# Patient Record
Sex: MALE | Race: WHITE | Employment: OTHER | URBAN - METROPOLITAN AREA
[De-identification: names, ages, dates, MRNs, and addresses within clinical notes are randomized per-mention and may not be internally consistent; named-entity substitution may affect disease eponyms.]

---

## 2021-07-23 RX ORDER — LIDOCAINE HYDROCHLORIDE 20 MG/ML
15 SOLUTION OROPHARYNGEAL AS NEEDED
Status: CANCELLED | OUTPATIENT
Start: 2021-07-23

## 2022-05-02 ENCOUNTER — HOSPITAL ENCOUNTER (OUTPATIENT)
Dept: SLEEP MEDICINE | Age: 77
Discharge: HOME | End: 2022-05-02
Payer: COMMERCIAL

## 2022-05-02 PROCEDURE — 95810 POLYSOM 6/> YRS 4/> PARAM: CPT

## 2022-05-10 ENCOUNTER — HOSPITAL ENCOUNTER (OUTPATIENT)
Age: 77
Setting detail: OUTPATIENT SURGERY
Discharge: HOME | End: 2022-05-10
Attending: INTERNAL MEDICINE | Admitting: INTERNAL MEDICINE
Payer: MEDICARE

## 2022-05-10 PROCEDURE — 91010 ESOPHAGUS MOTILITY STUDY: CPT | Performed by: INTERNAL MEDICINE

## 2022-05-10 PROCEDURE — 74011000250 HC RX REV CODE- 250: Performed by: INTERNAL MEDICINE

## 2022-05-10 PROCEDURE — 2709999900 HC NON-CHARGEABLE SUPPLY: Performed by: INTERNAL MEDICINE

## 2022-05-10 RX ORDER — OXYCODONE AND ACETAMINOPHEN 10; 325 MG/1; MG/1
TABLET ORAL
COMMUNITY

## 2022-05-10 RX ORDER — LOSARTAN POTASSIUM 100 MG/1
100 TABLET ORAL DAILY
COMMUNITY

## 2022-05-10 RX ORDER — DULOXETIN HYDROCHLORIDE 60 MG/1
60 CAPSULE, DELAYED RELEASE ORAL DAILY
COMMUNITY

## 2022-05-10 RX ORDER — LIDOCAINE HYDROCHLORIDE 20 MG/ML
15 SOLUTION OROPHARYNGEAL AS NEEDED
Status: DISCONTINUED | OUTPATIENT
Start: 2022-05-10 | End: 2022-05-10 | Stop reason: HOSPADM

## 2022-05-10 RX ORDER — POTASSIUM CITRATE 15 MEQ/1
15 TABLET, EXTENDED RELEASE ORAL 2 TIMES DAILY WITH MEALS
COMMUNITY

## 2022-05-10 RX ADMIN — Medication 15 ML: at 09:47

## 2022-05-24 NOTE — PROCEDURES
[facility]  PROCEDURE NOTE    Name:  Shila Morales  MR#:  [id number]  :  1945  ACCOUNT #:  [de-identified]  DATE OF SERVICE:  05/10/2022    PREOPERATIVE DIAGNOSES:  Gastroesophageal reflux disease and dysphagia. POSTOPERATIVE DIAGNOSIS:  Achalasia type 1 - see details below. PROCEDURE PERFORMED:  High-resolution impedance manometry. PROCEDURE:  After obtaining informed consent, the patient underwent nasal intubation. The lower esophageal sphincter was located between 47.9 and 52.2 cm from the nares. There was no hiatal hernia. The resting LES pressure mid respiration was grossly elevated at 62 mmHg. Relaxation was incomplete with IRP averaging 28 mmHg. Normal is less than 20. Ten wet swallows were recorded. All 10 failed. The DCI was virtually zero on all of these. There was no panesophageal pressurization or premature contractions. This is consistent with type 1 achalasia. The impedance metrics revealed 0% of liquid swallows and 20% of viscous swallows with complete transit. The UES pressure was 62 mmHg. DISPOSITION:  Further followup will be per Dr. [facility].       Oni Encarnacion MD      [id number]  D:  2022 8:38  T:  2022 15:10  JOB #:  [id number]  CC:  Imer Forman MD

## 2022-06-13 ENCOUNTER — HOSPITAL ENCOUNTER (OUTPATIENT)
Dept: SLEEP CENTER | Age: 77
Discharge: HOME | End: 2022-06-16
Payer: MEDICARE

## 2022-06-13 PROCEDURE — 95811 POLYSOM 6/>YRS CPAP 4/> PARM: CPT

## 2022-06-15 ENCOUNTER — TELEPHONE (OUTPATIENT)
Dept: SLEEP MEDICINE | Age: 77
End: 2022-06-15

## 2022-06-15 NOTE — TELEPHONE ENCOUNTER
Needs New Pt PSG: AHI 45.3 SaO2 80%    -pt returned called at 9:09am however states that he is unable to discuss results that this time as he had just arrived at the grocery store.  Pt states that once he returns home, he will return the call.      -spoke with pt and is scheduled for 06/22/22

## 2022-07-05 NOTE — PROGRESS NOTES
[address] [place]. [address] [facility], [address] [facility]  [phone]    Patient Name:  Ayse Collier  YOB: 1945      Office Visit 7/8/2022    CHIEF COMPLAINT:    Chief Complaint   Patient presents with    Sleep Apnea       HISTORY OF PRESENT ILLNESS:      This is a 22-year-old gentleman seen in outpatient consultation at the request of Dr. Jeanie Rojas for evaluation management of sleep apnea. The patient reports that he has over the past year developed severe issues with hypersomnia. He cannot explain it but he is sleeping 16 to 18 hours/day. He is on 4 times a day Percocet for chronic pain from his spine but he never had issues with the hypersomnia until the past year. Because of his issues with sleepiness, a polysomnography was ordered which revealed a very poor quality study initially with a sleep efficiency of only 27%. He did go into REM briefly. His AHI was markedly elevated at 45.3 and he desaturated as low as 80%. About half the study was spent with him having saturations less than 89%. He also demonstrated a very high number of leg movements with a PLM index being 86 though these were not associated with significant arousals. The overall arousal index however was elevated at 46.7. A subsequent CPAP titration was performed which revealed improved sleep quality with a sleep efficiency of 52%. The study however was limited by very poor REM sleep with only 1.6% of total sleep time being spent in REM sleep. The patient was noted to have a PLM index elevated 44.2 again not associated with significant arousals. The arousal index overall however was decreased down to 2.4/h with CPAP therapy. The patient does report a history of restless legs also developing of late. He is on Cymbalta which could potentially be promoting the leg movements. We will go ahead and check a ferritin level to determine if he is iron deficient.   If so supplemental iron may be helpful. He is on antidepressants with Zoloft which can also promote the leg movements. 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  He is now on antiretroviral therapy. His last CD4 count was over 930 back in December. The patient also reports a history of prostate cancer status post prostatectomy. He did have an artificial sphincter placed and does not have any issues with incontinence. He does however have to go to the bathroom about 4 times per night which significantly impairs his sleep quality. By his description he is spending much of the day sleeping as well. I also discussed with the patient that we need to rule out other etiologies for his rapidly progressive hypersomnia. He has not had his thyroid function checked in several years. He also takes narcotics 4 times daily and we need to rule out renal dysfunction since metabolites can build up in the setting of renal dysfunction and cause significant hypersomnia. We will check a TSH and BMP today as well.       Sleep Medicine 7/8/2022   Sitting and reading 0   Watching TV 1   Sitting, inactive in a public place (e.g. a theatre or a meeting) 0   As a passenger in a car for an hour without a break 1   Lying down to rest in the afternoon when circumstances permit 3   Sitting and talking to someone 0   Sitting quietly after a lunch without alcohol 1   In a car, while stopped for a few minutes in traffic 0   Total score 6       SLEEP STUDY PSG-5/2/22      CPAP TITRATION STUDY-6/13/22        Past Medical History:   Diagnosis Date    Chronic pain     HIV (human immunodeficiency virus infection) ([facility] [address]     Hypertension     Sleep apnea          Patient Active Problem List   Diagnosis    DOUGLAS (obstructive sleep apnea)    HTN (hypertension)    Chronic pain    Depression    Hypersomnia    RLS (restless legs syndrome)          Past Surgical History:   Procedure Laterality Date    SPINE SURGERY      For staph infection           Social History     Socioeconomic History    Marital status: Single     Spouse name: Not on file    Number of children: Not on file    Years of education: Not on file    Highest education level: Not on file   Occupational History    Not on file   Tobacco Use    Smoking status: Never Smoker    Smokeless tobacco: Never Used   Substance and Sexual Activity    Alcohol use: Not on file    Drug use: Not on file    Sexual activity: Not on file   Other Topics Concern    Not on file   Social History Narrative    Not on file     Social Determinants of Health     Financial Resource Strain:     Difficulty of Paying Living Expenses: Not on file   Food Insecurity:     Worried About Running Out of Food in the Last Year: Not on file    Charles of Food in the Last Year: Not on file   Transportation Needs:     Lack of Transportation (Medical): Not on file    Lack of Transportation (Non-Medical): Not on file   Physical Activity:     Days of Exercise per Week: Not on file    Minutes of Exercise per Session: Not on file   Stress:     Feeling of Stress : Not on file   Social Connections:     Frequency of Communication with Friends and Family: Not on file    Frequency of Social Gatherings with Friends and Family: Not on file    Attends Taoist Services: Not on file    Active Member of [address] [place] [address] [employer] or Organizations: Not on file    Attends Club or Organization Meetings: Not on file    Marital Status: Not on file   Intimate Partner Violence:     Fear of Current or Ex-Partner: Not on file    Emotionally Abused: Not on file    Physically Abused: Not on file    Sexually Abused: Not on file   Housing Stability:     Unable to Pay for Housing in the Last Year: Not on file    Number of [place] in the Last Year: Not on file    Unstable Housing in the Last Year: Not on file         No family history on file.       Allergies   Allergen Reactions    Latex Other (See Comments)     BLISTERS SKIN    Contrast [Iodides] Anaphylaxis     IVP DYE    Codeine Hives         Current Outpatient Medications   Medication Sig    atorvastatin (LIPITOR) 20 MG tablet TAKE 1 TABLET BY MOUTH EVERYDAY AT BEDTIME    DULoxetine (CYMBALTA) 20 MG extended release capsule Take 60 mg by mouth daily    elvitegravir-cobicistat-emtricitabine-tenofovir alafenamide (GENVOYA) [phone] MG TABLET Take 1 tablet by mouth    losartan (COZAAR) 100 MG tablet Take 100 mg by mouth daily    metoprolol tartrate (LOPRESSOR) 25 MG tablet Take 25 mg by mouth    Potassium Citrate ER 15 MEQ (1620 MG) TBCR Take 15 mEq by mouth 2 times daily (with meals)    sertraline (ZOLOFT) 100 MG tablet TAKE 1 TABLET BY MOUTH EVERY DAY    oxyCODONE-acetaminophen (PERCOCET)  MG per tablet Take 1 tablet by mouth every 6 hours as needed.  oxyCODONE ER (XTAMPZA ER) 13.5 MG C12A TAKE 1 CAPSULE BY MOUTH NIGHTLY WITH FOOD     No current facility-administered medications for this visit. REVIEW OF SYSTEMS:   CONSTITUTIONAL:   There is no history of fever, chills, night sweats, weight loss, weight gain; he has had persistent fatigue and severe lethargy/hypersomnolence. EYES:   Denies problems with eye pain, erythema, blurred vision, or visual field loss. ENTM:   Denies history of tinnitus, epistaxis, sore throat, hoarseness, or dysphonia. LYMPH:   Denies swollen glands. CARDIAC:   No chest pain, pressure, discomfort, palpitations, orthopnea, murmurs, or edema. GI:   No dysphagia, heartburn reflux, nausea/vomiting, diarrhea, abdominal pain, or bleeding. :   Denies history of dysuria, hematuria, polyuria, or decreased urine output. MS:   No history of myalgias, arthralgias, bone pain, or muscle cramps. SKIN:   No history of rashes, jaundice, cyanosis, nodules, or ulcers. ENDO:   Negative for heat or cold intolerance. No history of DM. PSYCH:   Negative for anxiety, depression, insomnia, hallucinations.     NEURO:   There is no history of AMS, persistent headache, decreased level of consciousness, seizures, or motor or sensory deficits. PHYSICAL EXAM:    Vitals:    07/08/22 1501   BP: 112/82   Pulse: 94   Resp: 15   Temp: 97.3 °F (36.3 °C)   SpO2: 96%        GENERAL APPEARANCE:   The patient is normal weight and in no respiratory distress. HEENT:   PERRL. Conjunctivae unremarkable. Nasal mucosa is without epistaxis, exudate, or polyps. Gums and dentition are unremarkable. There is moderate oropharyngeal narrowing. NECK/LYMPHATIC:   Symmetrical with no elevation of jugular venous pulsation. Trachea midline. No thyroid enlargement. No cervical adenopathy. LUNGS:   Normal respiratory effort with symmetrical lung expansion. Breath sounds are fairly clear bilaterally. HEART:   There is a regular rate and rhythm. No murmur, rub, or gallop. There is no edema in the lower extremities. ABDOMEN:   Soft and non-tender. Bowel sounds are normal.     SKIN:   There are no rashes, cyanosis, jaundice, or ecchymosis present. EXTREMITIES:   The extremities are unremarkable without clubbing, cyanosis, joint inflammation, degenerative, or ischemic change. MUSCULOSKELETAL:   There is no abnormal tone, muscle atrophy, or abnormal movement present. There is a deep old scar in the lower cervical/upper thoracic spine. NEURO:   The patient is alert and oriented to person, place, and time. Memory appears intact and mood is normal.  No gross sensorimotor deficits are present. ASSESSMENT:  (Medical Decision Making)         ICD-10-CM    1. DOUGLAS (obstructive sleep apnea)  G47.33 The patient has severe obstructive sleep apnea associated with moderately severe arterial hypoxemia. Application of CPAP at 12 cmH2O was adequate to control disordered breathing. CPAP titration was limited by very poor REM sleep duration and so I will treat him with APAP at 12-15 cmH2O just in case he needs more pressure.   He did like the full facemask use during the study and this will be maintained. 2. Hypertension, unspecified type  I10 Patient's blood pressure is adequately controlled. 3. Chronic pain syndrome  G89.4 The patient is on Percocet 4 times daily. Metabolites can build up and cause significant hypersomnia especially in the setting of renal insufficiency. A BMP will be checked. 4. Depression, unspecified depression type  F32. A The patient is being treated with Zoloft 100 mg daily. This medication can promote leg movements and restless legs. 5. Hypersomnia  G47.10 This is possibly related to his sleep apnea but could also be related to restless legs and/or medications particularly buildup of metabolites of Percocet. 6. RLS (restless legs syndrome)  G25.81 We will check a ferritin level today to make sure he is not iron deficient. He may need additional therapy if leg movements persist despite correction of his sleep disordered breathing. PLAN:    Begin APAP at 12-15 cmH2O with an EPR of 3. A large ResMed AirFit F 20 mask will be ordered for him. Check BMP, TSH, and ferritin level today. I will call him back with the results when available. Begin supplemental iron if ferritin level is low. Synthroid will be needed if his TSH level is low. Follow-up will be in about 4 months.            Orders Placed This Encounter   Procedures    TSH     Standing Status:   Future     Number of Occurrences:   1     Standing Expiration Date:   2023    Basic Metabolic Panel     Standing Status:   Future     Number of Occurrences:   1     Standing Expiration Date:   2023    Ferritin     Standing Status:   Future     Number of Occurrences:   1     Standing Expiration Date:   2023   [facility] DME - DURABLE MEDICAL EQUIPMENT     [facility]  Phone: [phone] [address]  Dept: [phone]      Patient Name: Reinaldo Boyle  : 1945  Gender: male  Address: [address] [place] [address]  Patient phone number: [phone] (home)       Primary Insurance: Payor: Anastacia Payne / Plan: Torin Cervantes / Product Type: *No Product type* /   Subscriber ID: [id number] - (Medicare Managed)      AMB Supply Order  Order Details     DME Location: [address] [place] [address] Road   Order Date: 7/8/2022   Diagnoses of DOUGLAS (obstructive sleep apnea), Hypertension, unspecified type, Chronic pain syndrome, Depression, unspecified depression type, Hypersomnia, and RLS (restless legs syndrome) were pertinent to this visit.           (  X   )New Set-Up    Machine   (     ) CPAP Unit  (  x   ) Auto CPAP Unit  (     ) BiLevel Unit  (     ) Auto BiLevel Unit  (     ) ASV   (     ) Bilevel ST    (     ) Oxygen Concentrator         Length of need: 12 months    Pressure: 12-15 cmH2O cmH20  EPR: 3     Starting Ramp Pressure:  6 cm H20  Ramp Time: min 20    Patient had a diagnostic Apnea Hypopnea Index (AHI) of : 45.3    *SUPPLIES* Replace all as needed, or per coverage guidelines     Masks Type:    (  x   ) -Full Face Mask (1 per 3 mon) <<< Large ResMed F 20 >>>  ( x    ) -Full Mask (1 per month) Interface/Cushion      (     ) -Nasal Mask (1 per 3 mon)  (     ) - Nasal Mask (1 per month) Interface/Cushion  (     ) -Pillow (2 per mon)  (     ) -Zovaouqvu (1 per 6 mon)      _________________________________________________________________          Other Supplies:    (  X   )-Jkwhmbiy (1 per 6 mon)  ( X    )-Yacaie Tubing (1 per 3 mon)  (  X   )- Disposable Filter (2 per mon)  (   X  )-Ovwnbt Humidifier (1 per year)     (  x   )-Xribsjxen (sometimes used with Full Face Mask) (1 per 6 mos)  (     )-Tubing-without heat (1 per 3 mos)  ( X   )-Non-Disposable Filter (1 per 6 mos)  (   x  )-Water Chamber (1 per 6 mos)  (     )-Humidifier non-heated (1 per 5 yrs)      Signed Date: 7/8/2022  Electronically Signed By: MD Nirmala Adams MD  Electronically signed    Over 50% of today's office visit was spent in face to face time reviewing test results, prognosis, importance of compliance, education about disease process, benefits of medications, instructions for management of acute flare-ups, and follow up plans. Total face to face time spent with the patient and charting was 60 minutes. Dictated using voice recognition software.   Proof read but unrecognized errors may exist.

## 2022-07-08 ENCOUNTER — OFFICE VISIT (OUTPATIENT)
Dept: SLEEP MEDICINE | Age: 77
End: 2022-07-08
Payer: MEDICARE

## 2022-07-08 ENCOUNTER — HOSPITAL ENCOUNTER (OUTPATIENT)
Dept: LAB | Age: 77
Discharge: HOME | End: 2022-07-11

## 2022-07-08 VITALS
HEART RATE: 94 BPM | BODY MASS INDEX: 28.23 KG/M2 | DIASTOLIC BLOOD PRESSURE: 82 MMHG | SYSTOLIC BLOOD PRESSURE: 112 MMHG | RESPIRATION RATE: 15 BRPM | OXYGEN SATURATION: 96 % | TEMPERATURE: 97.3 F | HEIGHT: 72 IN | WEIGHT: 208.4 LBS

## 2022-07-08 DIAGNOSIS — G89.4 CHRONIC PAIN SYNDROME: ICD-10-CM

## 2022-07-08 DIAGNOSIS — G47.33 OSA (OBSTRUCTIVE SLEEP APNEA): ICD-10-CM

## 2022-07-08 DIAGNOSIS — F32.A DEPRESSION, UNSPECIFIED DEPRESSION TYPE: ICD-10-CM

## 2022-07-08 DIAGNOSIS — G47.10 HYPERSOMNIA: ICD-10-CM

## 2022-07-08 DIAGNOSIS — G25.81 RLS (RESTLESS LEGS SYNDROME): ICD-10-CM

## 2022-07-08 DIAGNOSIS — I10 HYPERTENSION, UNSPECIFIED TYPE: ICD-10-CM

## 2022-07-08 PROBLEM — G89.29 CHRONIC PAIN: Status: ACTIVE | Noted: 2022-07-08

## 2022-07-08 PROCEDURE — 1036F TOBACCO NON-USER: CPT | Performed by: INTERNAL MEDICINE

## 2022-07-08 PROCEDURE — G8427 DOCREV CUR MEDS BY ELIG CLIN: HCPCS | Performed by: INTERNAL MEDICINE

## 2022-07-08 PROCEDURE — G8417 CALC BMI ABV UP PARAM F/U: HCPCS | Performed by: INTERNAL MEDICINE

## 2022-07-08 PROCEDURE — 99205 OFFICE O/P NEW HI 60 MIN: CPT | Performed by: INTERNAL MEDICINE

## 2022-07-08 PROCEDURE — 3017F COLORECTAL CA SCREEN DOC REV: CPT | Performed by: INTERNAL MEDICINE

## 2022-07-08 PROCEDURE — 1123F ACP DISCUSS/DSCN MKR DOCD: CPT | Performed by: INTERNAL MEDICINE

## 2022-07-08 RX ORDER — POTASSIUM CITRATE 15 MEQ/1
15 TABLET, EXTENDED RELEASE ORAL 2 TIMES DAILY WITH MEALS
COMMUNITY
Start: 2021-10-19

## 2022-07-08 RX ORDER — SERTRALINE HYDROCHLORIDE 100 MG/1
TABLET, FILM COATED ORAL
COMMUNITY
Start: 2022-07-01

## 2022-07-08 RX ORDER — OXYCODONE AND ACETAMINOPHEN 10; 325 MG/1; MG/1
1 TABLET ORAL EVERY 6 HOURS PRN
COMMUNITY

## 2022-07-08 RX ORDER — LOSARTAN POTASSIUM 100 MG/1
100 TABLET ORAL DAILY
COMMUNITY

## 2022-07-08 RX ORDER — DULOXETIN HYDROCHLORIDE 20 MG/1
60 CAPSULE, DELAYED RELEASE ORAL DAILY
COMMUNITY

## 2022-07-08 RX ORDER — OXYCODONE 13.5 MG/1
CAPSULE, EXTENDED RELEASE ORAL
COMMUNITY
Start: 2022-05-31

## 2022-07-08 RX ORDER — ATORVASTATIN CALCIUM 20 MG/1
TABLET, FILM COATED ORAL
COMMUNITY
Start: 2022-06-09

## 2022-07-08 ASSESSMENT — SLEEP AND FATIGUE QUESTIONNAIRES
HOW LIKELY ARE YOU TO NOD OFF OR FALL ASLEEP WHILE SITTING INACTIVE IN A PUBLIC PLACE: 0
HOW LIKELY ARE YOU TO NOD OFF OR FALL ASLEEP WHILE SITTING AND TALKING TO SOMEONE: 0
HOW LIKELY ARE YOU TO NOD OFF OR FALL ASLEEP WHILE LYING DOWN TO REST IN THE AFTERNOON WHEN CIRCUMSTANCES PERMIT: 3
HOW LIKELY ARE YOU TO NOD OFF OR FALL ASLEEP WHILE SITTING QUIETLY AFTER LUNCH WITHOUT ALCOHOL: 1
HOW LIKELY ARE YOU TO NOD OFF OR FALL ASLEEP WHILE SITTING AND READING: 0
HOW LIKELY ARE YOU TO NOD OFF OR FALL ASLEEP WHEN YOU ARE A PASSENGER IN A CAR FOR AN HOUR WITHOUT A BREAK: 1
HOW LIKELY ARE YOU TO NOD OFF OR FALL ASLEEP IN A CAR, WHILE STOPPED FOR A FEW MINUTES IN TRAFFIC: 0
ESS TOTAL SCORE: 6
HOW LIKELY ARE YOU TO NOD OFF OR FALL ASLEEP WHILE WATCHING TV: 1

## 2022-08-08 ENCOUNTER — HOSPITAL ENCOUNTER (OUTPATIENT)
Dept: LAB | Age: 77
Setting detail: SPECIMEN
Discharge: HOME | End: 2022-08-11

## 2022-08-08 PROCEDURE — 88305 TISSUE EXAM BY PATHOLOGIST: CPT

## 2022-08-08 PROCEDURE — 88312 SPECIAL STAINS GROUP 1: CPT

## (undated) DEVICE — TISSUE FACE KLNX 9X8IN --

## (undated) DEVICE — SYRINGE CATH TIP 50ML

## (undated) DEVICE — 3M™ TRANSPORE™ WHITE SURGICAL TAPE 1534-1, 1 INCH X 10 YARD (2,5CM X 9,1M), 12 ROLLS/CARTON 10 CARTONS/CASE: Brand: 3M™ TRANSPORE™

## (undated) DEVICE — BASIN EMESIS 500CC ROSE 250/CS 60/PLT: Brand: MEDEGEN MEDICAL PRODUCTS, LLC

## (undated) DEVICE — SYR 3ML LL TIP 1/10ML GRAD --

## (undated) DEVICE — SOLUTION EFT TEST GI VISCOUS

## (undated) DEVICE — CUP MED 1OZ CLR POLYPR FEED GRAD W/O LID